# Patient Record
Sex: FEMALE | Race: WHITE | HISPANIC OR LATINO | Employment: UNEMPLOYED | URBAN - METROPOLITAN AREA
[De-identification: names, ages, dates, MRNs, and addresses within clinical notes are randomized per-mention and may not be internally consistent; named-entity substitution may affect disease eponyms.]

---

## 2019-12-18 ENCOUNTER — OFFICE VISIT (OUTPATIENT)
Dept: URGENT CARE | Facility: CLINIC | Age: 54
End: 2019-12-18
Payer: MEDICAID

## 2019-12-18 VITALS
BODY MASS INDEX: 34.99 KG/M2 | HEIGHT: 65 IN | WEIGHT: 210 LBS | HEART RATE: 58 BPM | TEMPERATURE: 98 F | DIASTOLIC BLOOD PRESSURE: 64 MMHG | OXYGEN SATURATION: 100 % | SYSTOLIC BLOOD PRESSURE: 171 MMHG | RESPIRATION RATE: 18 BRPM

## 2019-12-18 DIAGNOSIS — S52.592A OTHER CLOSED FRACTURE OF DISTAL END OF LEFT RADIUS, INITIAL ENCOUNTER: Primary | ICD-10-CM

## 2019-12-18 DIAGNOSIS — M79.632 PAIN OF LEFT FOREARM: ICD-10-CM

## 2019-12-18 PROCEDURE — 99203 OFFICE O/P NEW LOW 30 MIN: CPT | Mod: S$GLB,,, | Performed by: PHYSICIAN ASSISTANT

## 2019-12-18 PROCEDURE — 73090 XR FOREARM LEFT: ICD-10-PCS | Mod: FY,TIER,LT,S$GLB | Performed by: RADIOLOGY

## 2019-12-18 PROCEDURE — 99203 PR OFFICE/OUTPT VISIT, NEW, LEVL III, 30-44 MIN: ICD-10-PCS | Mod: S$GLB,,, | Performed by: PHYSICIAN ASSISTANT

## 2019-12-18 PROCEDURE — 73090 X-RAY EXAM OF FOREARM: CPT | Mod: FY,TIER,LT,S$GLB | Performed by: RADIOLOGY

## 2019-12-18 PROCEDURE — 73110 XR WRIST COMPLETE 3 VIEWS LEFT: ICD-10-PCS | Mod: FY,TIER,LT,S$GLB | Performed by: RADIOLOGY

## 2019-12-18 PROCEDURE — 73110 X-RAY EXAM OF WRIST: CPT | Mod: FY,TIER,LT,S$GLB | Performed by: RADIOLOGY

## 2019-12-18 RX ORDER — IBUPROFEN 600 MG/1
600 TABLET ORAL EVERY 6 HOURS PRN
Qty: 30 TABLET | Refills: 0 | Status: SHIPPED | OUTPATIENT
Start: 2019-12-18 | End: 2020-12-17

## 2019-12-18 RX ORDER — IBUPROFEN 200 MG
600 TABLET ORAL
Status: COMPLETED | OUTPATIENT
Start: 2019-12-18 | End: 2019-12-18

## 2019-12-18 RX ADMIN — Medication 600 MG: at 10:12

## 2019-12-18 NOTE — PATIENT INSTRUCTIONS
¿Qué es adrian fractura distal del radio?    Adrian fractura es un hueso roto. Adrian fractura distal del radio es adrian rotura en el extremo inferior del radio. Felisa es el hueso más graciela del antebrazo. Dado que la rotura se produce cerca de la lynn, se suele decir que es adrian fractura de lynn.   El hueso puede agrietarse o romperse en dos partes o más. Los trozos de hueso pueden courtney quedado alineados o salidos de turner lugar. En ocasiones, el hueso puede traspasar la piel. Los nervios, tejidos y articulaciones cercanos también pueden dañarse. Según la gravedad de la fractura, la recuperación puede rebeka varios meses o más.  ¿Cuáles son las causas de adrian fractura distal del radio?  Felisa tipo de fractura suele deberse con más frecuencia a caer sobre la mano extendida. También pueden deberse a un golpe, un accidente o adrian lesión deportiva.  Síntomas de adrian fractura distal del radio  Los síntomas pueden incluir dolor, inflamación y moretones. Si el hueso atraviesa la piel al romperse, también puede courtney sangrado externo. La lynn puede verse torcida, deformada o doblada. Puede que le resulte difícil  o usar el brazo, la lynn y la mano para las tareas y actividades normales.  Tratamiento de adrian fractura distal del radio  El tratamiento depende de la gravedad de la fractura. De ser necesario, se vuelve a colocar el hueso en turner posición. [place] puede hacerse mediante adrian cirugía o no. Si es necesaria la cirugía, el cirujano puede usar dispositivos tales blanca pernos, placas o tornillos para sostener juntas las partes del hueso roto. Puede que necesite usar adrian férula o un yeso por un mes o más para proteger el hueso y mantenerlo en turner lugar gwyn turner recuperación. También pueden usarse otros tratamientos para ayudar a reducir los síntomas o recuperar el funcionamiento. Incluyen:  · Compresas frías. Colocar adrian compresa de hielo sobre la tobin lesionada puede ayudar a reducir la inflamación y el dolor.  · Elevar el brazo  y la lynn. Mantener el brazo y la lynn levantados por encima del nivel del corazón puede ayudar a reducir la inflamación.  · Medicamentos analgésicos (calmantes). Trevor medicamentos analgésicos de venta con o sin receta puede ayudar a reducir el dolor y la inflamación.  · Ejercicios. Hacer ciertos ejercicios en casa o con un fisioterapeuta puede ayudarle a recobrar la fuerza, la flexibilidad y la amplitud de movimiento de turner brazo, turner lynn y turner mano. Por lo general, no comenzará con los ejercicios hasta después de que le hayan quitado la férula o el yeso.  Posibles complicaciones de adrian fractura distal del radio  Pueden incluir lo siguiente:  · Que el hueso no se suelde trini  · Debilidad, rigidez o pérdida de amplitud de movimiento en el brazo, la lynn o la mano  · Osteoartritis en la articulación de la lynn  Cuándo llamar a turner proveedor de atención médica  Llame a turner proveedor de atención médica de inmediato si nota alguno de los siguientes síntomas:  · Fiebre de 100.4º F (38º C) o superior, o según le indiquen  · Los síntomas no mejoran con el tratamiento, o empeoran  · Entumecimiento, frío o inflamación del brazo, la mano o los dedos  · Las uñas de la mano se vuelven de color azulado o grisáceo  · La férula o el yeso se dañan o se sienten demasiado sueltos o demasiado ajustados  · Síntomas nuevos   Date Last Reviewed: 3/10/2016  © 8498-5003 [employer]. [address] [place] [address], [place] [address]. All rights reserved. This information is not intended as a substitute for professional medical care. Always follow your healthcare professional's instructions.      Please follow up with your Primary care provider within 2-5 days if your signs and symptoms have not resolved or worsen.     If your condition worsens or fails to improve we recommend that you receive another evaluation at the emergency room immediately or contact your primary medical clinic to discuss your concerns.   You must  understand that you have received an Urgent Care treatment only and that you may be released before all of your medical problems are known or treated. You, the patient, will arrange for follow up care as instructed.     RED FLAGS/WARNING SYMPTOMS DISCUSSED WITH PATIENT THAT WOULD WARRANT EMERGENT MEDICAL ATTENTION. PATIENT VERBALIZED UNDERSTANDING.

## 2019-12-18 NOTE — LETTER
December 18, 2019      [facility]  [address] [address]  Phone: [phone]  Fax: [phone]       Patient: Sindi Britton   YOB: 1965  Date of Visit: 12/18/2019    To Whom It May Concern:    Iesha Britton  was at [facility] on 12/18/2019. She may return to work/school on 12/19/2019 with restrictions.  She may not use her left upper extremity for minimum of 2 weeks.  If you have any questions or concerns, or if I can be of further assistance, please do not hesitate to contact me.    Sincerely,    Kelsie Williamson PA-C

## 2019-12-18 NOTE — PROGRESS NOTES
"Subjective:       Patient ID: Sindi Britton is a 54 y.o. female.    Vitals:  height is 5' 5" (1.651 m) and weight is 95.3 kg (210 lb). Her temperature is 97.5 °F (36.4 °C). Her blood pressure is 171/64 (abnormal) and her pulse is 58 (abnormal). Her respiration is 18 and oxygen saturation is 100%.     Chief Complaint: Arm Pain (left foearm pain)    Patient had a fish injury at home.  Reports left forearm pain.  Reports pain from wrist to elbow.  Denies numbness tingling or weakness of the left arm.    Arm Pain    The incident occurred 6 to 12 hours ago. The incident occurred at home. The injury mechanism was a fall. The pain is present in the left forearm. The quality of the pain is described as aching. The pain does not radiate. The pain is at a severity of 10/10. The pain is severe. The pain has been constant since the incident. Pertinent negatives include no chest pain, muscle weakness or numbness. Nothing aggravates the symptoms. She has tried ice and acetaminophen for the symptoms. The treatment provided mild relief.       Constitution: Negative for fatigue.   HENT: Negative for facial swelling and facial trauma.    Neck: Negative for neck stiffness.   Cardiovascular: Negative for chest trauma and chest pain.   Eyes: Negative for eye trauma, double vision and blurred vision.   Gastrointestinal: Negative for abdominal trauma, abdominal pain and rectal bleeding.   Genitourinary: Negative for hematuria, missed menses, genital trauma and pelvic pain.   Musculoskeletal: Positive for pain and trauma. Negative for joint swelling and abnormal ROM of joint.   Skin: Negative for color change, wound, abrasion, laceration and bruising.   Neurological: Negative for dizziness, history of vertigo, light-headedness, coordination disturbances, altered mental status, loss of consciousness and numbness.   Hematologic/Lymphatic: Negative for history of bleeding disorder.   Psychiatric/Behavioral: Negative for altered mental " status.       Objective:      Physical Exam   Constitutional: She is oriented to person, place, and time. She appears well-developed and well-nourished. She is cooperative.  Non-toxic appearance. She does not appear ill. No distress.   HENT:   Head: Normocephalic and atraumatic.   Right Ear: Hearing, tympanic membrane, external ear and ear canal normal.   Left Ear: Hearing, tympanic membrane, external ear and ear canal normal.   Nose: Nose normal. No mucosal edema, rhinorrhea or nasal deformity. No epistaxis. Right sinus exhibits no maxillary sinus tenderness and no frontal sinus tenderness. Left sinus exhibits no maxillary sinus tenderness and no frontal sinus tenderness.   Mouth/Throat: Uvula is midline, oropharynx is clear and moist and mucous membranes are normal. No trismus in the jaw. Normal dentition. No uvula swelling. No posterior oropharyngeal erythema.   Eyes: Conjunctivae and lids are normal. Right eye exhibits no discharge. Left eye exhibits no discharge. No scleral icterus.   Neck: Trachea normal, normal range of motion, full passive range of motion without pain and phonation normal. Neck supple.   Cardiovascular: Normal rate, regular rhythm, normal heart sounds, intact distal pulses and normal pulses.   Pulmonary/Chest: Effort normal and breath sounds normal. No respiratory distress.   Abdominal: Soft. Normal appearance and bowel sounds are normal. She exhibits no distension, no pulsatile midline mass and no mass. There is no tenderness.   Musculoskeletal: She exhibits no edema or deformity.        Left shoulder: She exhibits normal range of motion, no tenderness, no bony tenderness, no swelling, no effusion, no crepitus, no deformity, no laceration, no pain, no spasm, normal pulse and normal strength.        Left elbow: She exhibits normal range of motion, no swelling, no effusion, no deformity and no laceration. No tenderness found.        Left wrist: She exhibits decreased range of motion,  tenderness, bony tenderness and swelling. She exhibits no effusion, no crepitus, no deformity and no laceration.        Left upper arm: She exhibits no tenderness, no bony tenderness, no swelling, no edema, no deformity and no laceration.        Left forearm: She exhibits no tenderness, no bony tenderness, no swelling, no edema, no deformity and no laceration.        Right hand: She exhibits normal range of motion, no tenderness, no bony tenderness, normal two-point discrimination, normal capillary refill, no deformity, no laceration and no swelling. Normal sensation noted. Normal strength noted.        Left hand: She exhibits normal range of motion, no tenderness, no bony tenderness, normal two-point discrimination, normal capillary refill, no deformity, no laceration and no swelling. Normal sensation noted. Normal strength noted.   Neurological: She is alert and oriented to person, place, and time. She exhibits normal muscle tone. Coordination normal.   Skin: Skin is warm, dry, intact, not diaphoretic and not pale.   Psychiatric: She has a normal mood and affect. Her speech is normal and behavior is normal. Judgment and thought content normal. Cognition and memory are normal.   Nursing note and vitals reviewed.      XRAY:  Left distal radius fracture    Patient placed in a short-arm splint.  Patient neurovascularly intact distal get sensation light touch brisk capillary refill post splint placement.  Discussed with patient follow-up her primary care physician a minimum of 2 weeks for repeat radiographs and further evaluation.  Patient verbalized understanding.    Assessment:       1. Other closed fracture of distal end of left radius, initial encounter    2. Pain of left forearm        Plan:         Other closed fracture of distal end of left radius, initial encounter  -     Cancel: WRIST BRACE FOR HOME USE    Pain of left forearm  -     X-Ray Forearm Left; Future; Expected date: 12/18/2019  -     ibuprofen tablet  600 mg  -     X-Ray Wrist Complete Left; Future; Expected date: 12/18/2019  -     Cancel: WRIST BRACE FOR HOME USE    Other orders  -     ibuprofen (ADVIL,MOTRIN) 600 MG tablet; Take 1 tablet (600 mg total) by mouth every 6 (six) hours as needed.  Dispense: 30 tablet; Refill: 0     Discussed splint care with patient thoroughly.  Discussed to follow up with her primary care physician a minimum of 2 weeks for repeat radiographs and further evaluation.  She verbalized understanding.  Return to clinic precautions and ER precautions discussed.  She agrees with plan of care.       ¿Qué es adrian fractura distal del radio?    Adrian fractura es un hueso roto. Adrian fractura distal del radio es adrian rotura en el extremo inferior del radio. Felisa es el hueso más graciela del antebrazo. Dado que la rotura se produce cerca de la lynn, se suele decir que es adrian fractura de lynn.   El hueso puede agrietarse o romperse en dos partes o más. Los trozos de hueso pueden courtney quedado alineados o salidos de turner lugar. En ocasiones, el hueso puede traspasar la piel. Los nervios, tejidos y articulaciones cercanos también pueden dañarse. Según la gravedad de la fractura, la recuperación puede rebeka varios meses o más.  ¿Cuáles son las causas de adrian fractura distal del radio?  Felisa tipo de fractura suele deberse con más frecuencia a caer sobre la mano extendida. También pueden deberse a un golpe, un accidente o adrian lesión deportiva.  Síntomas de adrian fractura distal del radio  Los síntomas pueden incluir dolor, inflamación y moretones. Si el hueso atraviesa la piel al romperse, también puede courtney sangrado externo. La lynn puede verse torcida, deformada o doblada. Puede que le resulte difícil  o usar el brazo, la lynn y la mano para las tareas y actividades normales.  Tratamiento de adrian fractura distal del radio  El tratamiento depende de la gravedad de la fractura. De ser necesario, se vuelve a colocar el hueso en turner posición. [place] puede  hacerse mediante adrian cirugía o no. Si es necesaria la cirugía, el cirujano puede usar dispositivos tales blanca pernos, placas o tornillos para sostener juntas las partes del hueso roto. Puede que necesite usar adrian férula o un yeso por un mes o más para proteger el hueso y mantenerlo en turner lugar gwyn turner recuperación. También pueden usarse otros tratamientos para ayudar a reducir los síntomas o recuperar el funcionamiento. Incluyen:  · Compresas frías. Colocar adrian compresa de hielo sobre la tobin lesionada puede ayudar a reducir la inflamación y el dolor.  · Elevar el brazo y la lynn. Mantener el brazo y la lynn levantados por encima del nivel del corazón puede ayudar a reducir la inflamación.  · Medicamentos analgésicos (calmantes). Trevor medicamentos analgésicos de venta con o sin receta puede ayudar a reducir el dolor y la inflamación.  · Ejercicios. Hacer ciertos ejercicios en casa o con un fisioterapeuta puede ayudarle a recobrar la fuerza, la flexibilidad y la amplitud de movimiento de turner brazo, turner lynn y turner mano. Por lo general, no comenzará con los ejercicios hasta después de que le hayan quitado la férula o el yeso.  Posibles complicaciones de adrian fractura distal del radio  Pueden incluir lo siguiente:  · Que el hueso no se suelde trini  · Debilidad, rigidez o pérdida de amplitud de movimiento en el brazo, la lynn o la mano  · Osteoartritis en la articulación de la lynn  Cuándo llamar a turner proveedor de atención médica  Llame a turner proveedor de atención médica de inmediato si nota alguno de los siguientes síntomas:  · Fiebre de 100.4º F (38º C) o superior, o según le indiquen  · Los síntomas no mejoran con el tratamiento, o empeoran  · Entumecimiento, frío o inflamación del brazo, la mano o los dedos  · Las uñas de la mano se vuelven de color azulado o grisáceo  · La férula o el yeso se dañan o se sienten demasiado sueltos o demasiado ajustados  · Síntomas nuevos   Date Last Reviewed: 3/10/2016  ©  7406-5384 [employer]. [address] [place] [address], [place] [address]. All rights reserved. This information is not intended as a substitute for professional medical care. Always follow your healthcare professional's instructions.      Please follow up with your Primary care provider within 2-5 days if your signs and symptoms have not resolved or worsen.     If your condition worsens or fails to improve we recommend that you receive another evaluation at the emergency room immediately or contact your primary medical clinic to discuss your concerns.   You must understand that you have received an Urgent Care treatment only and that you may be released before all of your medical problems are known or treated. You, the patient, will arrange for follow up care as instructed.     RED FLAGS/WARNING SYMPTOMS DISCUSSED WITH PATIENT THAT WOULD WARRANT EMERGENT MEDICAL ATTENTION. PATIENT VERBALIZED UNDERSTANDING.

## 2020-01-02 ENCOUNTER — OFFICE VISIT (OUTPATIENT)
Dept: URGENT CARE | Facility: CLINIC | Age: 55
End: 2020-01-02
Payer: MEDICAID

## 2020-01-02 ENCOUNTER — TELEPHONE (OUTPATIENT)
Dept: URGENT CARE | Facility: CLINIC | Age: 55
End: 2020-01-02

## 2020-01-02 VITALS
HEART RATE: 63 BPM | RESPIRATION RATE: 19 BRPM | BODY MASS INDEX: 34.99 KG/M2 | DIASTOLIC BLOOD PRESSURE: 82 MMHG | SYSTOLIC BLOOD PRESSURE: 154 MMHG | WEIGHT: 210 LBS | OXYGEN SATURATION: 98 % | TEMPERATURE: 98 F | HEIGHT: 65 IN

## 2020-01-02 DIAGNOSIS — S52.592A OTHER CLOSED FRACTURE OF DISTAL END OF LEFT RADIUS, INITIAL ENCOUNTER: Primary | ICD-10-CM

## 2020-01-02 PROCEDURE — 99203 OFFICE O/P NEW LOW 30 MIN: CPT | Mod: S$GLB,,, | Performed by: PHYSICIAN ASSISTANT

## 2020-01-02 PROCEDURE — 99203 PR OFFICE/OUTPT VISIT, NEW, LEVL III, 30-44 MIN: ICD-10-PCS | Mod: S$GLB,,, | Performed by: PHYSICIAN ASSISTANT

## 2020-01-02 PROCEDURE — 73110 X-RAY EXAM OF WRIST: CPT | Mod: FY,TIER,LT,S$GLB | Performed by: RADIOLOGY

## 2020-01-02 PROCEDURE — 73110 XR WRIST COMPLETE 3 VIEWS LEFT: ICD-10-PCS | Mod: FY,TIER,LT,S$GLB | Performed by: RADIOLOGY

## 2020-01-02 NOTE — PATIENT INSTRUCTIONS
Need to follow up with orthopedics before returning to full duty.   Can return to work on light duty.   Call [phone] for orthopedics appointment      Please return here or go to the Emergency Department for any concerns or worsening of condition.  If you were prescribed antibiotics, please take them to completion.  If you were prescribed a narcotic medication, do not drive or operate heavy equipment or machinery while taking these medications.  Please follow up with your primary care doctor or specialist as needed.    If you  smoke, please stop smoking.          Forearm Fracture  You have a break or fracture of both bones in the forearm. The bones are not out of place and will not need to be set. This fracture usually takes 6 to 8 weeks to heal completely. Initial treatment is with a splint or cast.    Home care  · Keep your arm elevated to reduce pain and swelling. When sitting or lying down elevate your arm above the level of your heart. You can do this by placing your arm on a pillow that rests on your chest or on a pillow at your side. This is most important during the first 48 hours after injury.  · Apply an ice pack over the injured area for 15 to 20 minutes every 3 to 6 hours. You should do this for the first 24 to 48 hours. You can make an ice pack by filling a plastic bag that seals at the top with ice cubes and then wrapping it with a thin towel. Be careful not to injure your skin with the ice treatments. Ice should never be applied directly to skin. As the ice melts, be careful that the cast or splint doesnt get wet. You can place the ice pack inside the sling and directly over the splint or cast. Continue with ice packs as needed for the relief of pain and swelling.  · Keep the cast or splint completely dry at all times. Bathe with your cast or splint out of the water. Protect it with 2 large plastic bags, one outside of the other, each taped with duct tape at the top end. If a fiberglass splint  or cast gets wet, you can dry it with a hair dryer on a cool setting.  · You may use over-the-counter pain medicine to control pain, unless another pain medicine was prescribed. If you have chronic liver or kidney disease or ever had a stomach ulcer or GI bleeding, talk with your healthcare provider before using these medicines.  Follow-up care  Follow up with your healthcare provider, or as advised. If a splint was applied, it may be changed to a cast during your follow-up visit.  If X-rays were taken, you will be told of any new findings that may affect your care.  When to seek medical advice  Call your healthcare provider right away if any of the following occur:  · The plaster cast or splint becomes wet or soft  · The fiberglass cast or splint remains wet for more than 24 hours  · Increased tightness, looseness, or pain occurs under the cast or splint  · Fingers become swollen, cold, blue, numb or tingly  · The cast develops a foul odor  Date Last Reviewed: 12/3/2015  © 4274-3682 [employer]. [address] [place] [address], [place] [address]. All rights reserved. This information is not intended as a substitute for professional medical care. Always follow your healthcare professional's instructions.

## 2020-01-02 NOTE — PROGRESS NOTES
"Subjective:       Patient ID: Sindi Britton is a 54 y.o. female.    Vitals:  height is 5' 5" (1.651 m) and weight is 95.3 kg (210 lb). Her tympanic temperature is 98.4 °F (36.9 °C). Her blood pressure is 154/82 (abnormal) and her pulse is 63. Her respiration is 19 and oxygen saturation is 98%.     Chief Complaint: Wrist Pain (LT)    This is a 54-year-old female for left wrist follow-up.  Seen 12/18/2020 diagnosed with fracture of distal radius on the left at styloid process.  She presents for follow-up, has not seen primary care or orthopedics.  She would like to be cleared for work.  She works at a hotel, for work does lots of writing on a whiteboard.  Denies any pain whatsoever to her wrist, no numbness, tingling or radiation of pain. Took off her splint 3 days ago.    Wrist Pain    The pain is present in the right wrist. This is a recurrent problem. The current episode started 1 to 4 weeks ago. The problem occurs rarely. The problem has been rapidly improving. The pain is at a severity of 0/10. The patient is experiencing no pain. Pertinent negatives include no fever or joint swelling. The treatment provided moderate relief.       Constitution: Negative for chills, fatigue and fever.   HENT: Negative for congestion and sore throat.    Neck: Negative for painful lymph nodes.   Cardiovascular: Negative for chest pain and leg swelling.   Eyes: Negative for double vision and blurred vision.   Respiratory: Negative for cough and shortness of breath.    Gastrointestinal: Negative for nausea, vomiting and diarrhea.   Genitourinary: Negative for dysuria, frequency, urgency and history of kidney stones.   Musculoskeletal: Negative for joint pain, joint swelling, muscle cramps and muscle ache.   Skin: Negative for color change, pale, rash and bruising.   Allergic/Immunologic: Negative for seasonal allergies.   Neurological: Negative for dizziness, history of vertigo, light-headedness, passing out and headaches. "   Hematologic/Lymphatic: Negative for swollen lymph nodes.   Psychiatric/Behavioral: Negative for nervous/anxious, sleep disturbance and depression. The patient is not nervous/anxious.        Objective:      Physical Exam   Constitutional: She is oriented to person, place, and time. She appears well-developed and well-nourished. She is cooperative.  Non-toxic appearance. She does not appear ill. No distress.   HENT:   Head: Normocephalic and atraumatic.   Right Ear: Hearing, tympanic membrane, external ear and ear canal normal.   Left Ear: Hearing, tympanic membrane, external ear and ear canal normal.   Nose: Nose normal. No mucosal edema, rhinorrhea or nasal deformity. No epistaxis. Right sinus exhibits no maxillary sinus tenderness and no frontal sinus tenderness. Left sinus exhibits no maxillary sinus tenderness and no frontal sinus tenderness.   Mouth/Throat: Uvula is midline, oropharynx is clear and moist and mucous membranes are normal. No trismus in the jaw. Normal dentition. No uvula swelling. No posterior oropharyngeal erythema.   Eyes: Conjunctivae and lids are normal. Right eye exhibits no discharge. Left eye exhibits no discharge. No scleral icterus.   Neck: Trachea normal, normal range of motion, full passive range of motion without pain and phonation normal. Neck supple.   Cardiovascular: Normal rate, regular rhythm, normal heart sounds, intact distal pulses and normal pulses.   Pulmonary/Chest: Effort normal and breath sounds normal. No respiratory distress.   Abdominal: Soft. Normal appearance and bowel sounds are normal. She exhibits no distension, no pulsatile midline mass and no mass. There is no tenderness.   Musculoskeletal: Normal range of motion. She exhibits no edema or deformity.        Right wrist: Normal. She exhibits no tenderness, no bony tenderness and no swelling.        Left wrist: She exhibits no tenderness, no bony tenderness, no swelling, no effusion, no crepitus and no deformity.    Slight decreased range of motion in left wrist, likely secondary to decreased activity for past 2 weeks   Neurological: She is alert and oriented to person, place, and time. She exhibits normal muscle tone. Coordination normal.   Skin: Skin is warm, dry, intact, not diaphoretic and not pale.   Psychiatric: She has a normal mood and affect. Her speech is normal and behavior is normal. Judgment and thought content normal. Cognition and memory are normal.   Nursing note and vitals reviewed.      X-ray Forearm Left    Result Date: 12/18/2019  EXAMINATION: XR FOREARM LEFT CLINICAL HISTORY: Pain in left forearm TECHNIQUE: AP and lateral views of the left forearm were performed. COMPARISON: None FINDINGS: There is mild irregularity of the distal radius and there may be an undisplaced fracture along its distal end.  Radiographs of the wrist may be helpful.  No displacement or angulation is seen.     Possible fracture of the distal radius Electronically signed by: River Covington MD Date:    12/18/2019 Time:    10:41    X-ray Wrist Complete Left    Result Date: 1/2/2020  EXAMINATION: XR WRIST COMPLETE 3 VIEWS LEFT CLINICAL HISTORY: possible distal radius fracture 12/18, follow up xray, no pain;  Other fractures of lower end of left radius, initial encounter for closed fracture FINDINGS: Three views: Findings are worrisome for a distal radial fracture nondisplaced. Electronically signed by: Juan Alberto Quintana MD Date:    01/02/2020 Time:    13:35    X-ray Wrist Complete Left    Result Date: 12/18/2019  EXAMINATION: XR WRIST COMPLETE 3 VIEWS LEFT CLINICAL HISTORY: Pain in left forearm TECHNIQUE: PA, lateral, and oblique views of the left wrist were performed. COMPARISON: None FINDINGS: There are faintly visualized vertical lines noted through the styloid process of the radius and a nondisplaced fracture can not be ruled out.  No other definite fractures or dislocation.  Follow-up study recommended     See above Electronically  signed by: Eugenio Simmons MD Date:    12/18/2019 Time:    10:42        Assessment:       1. Other closed fracture of distal end of left radius, initial encounter        Plan:       Xray still with fracture  Cleared for light duty for work, no lifting or heavy work, right handed - ok to do writing  Need to f/u with ortho, referral put in  Thumb spica placed    Other closed fracture of distal end of left radius, initial encounter  -     X-Ray Wrist Complete Left; Future; Expected date: 01/02/2020  -     Ambulatory referral/consult to Orthopedics  -     SPLINT FOR HOME USE         Patient Instructions     Need to follow up with orthopedics before returning to full duty.   Can return to work on light duty.   Call [phone] for orthopedics appointment      Please return here or go to the Emergency Department for any concerns or worsening of condition.  If you were prescribed antibiotics, please take them to completion.  If you were prescribed a narcotic medication, do not drive or operate heavy equipment or machinery while taking these medications.  Please follow up with your primary care doctor or specialist as needed.    If you  smoke, please stop smoking.          Forearm Fracture  You have a break or fracture of both bones in the forearm. The bones are not out of place and will not need to be set. This fracture usually takes 6 to 8 weeks to heal completely. Initial treatment is with a splint or cast.    Home care  · Keep your arm elevated to reduce pain and swelling. When sitting or lying down elevate your arm above the level of your heart. You can do this by placing your arm on a pillow that rests on your chest or on a pillow at your side. This is most important during the first 48 hours after injury.  · Apply an ice pack over the injured area for 15 to 20 minutes every 3 to 6 hours. You should do this for the first 24 to 48 hours. You can make an ice pack by filling a plastic bag that seals at the top with ice  cubes and then wrapping it with a thin towel. Be careful not to injure your skin with the ice treatments. Ice should never be applied directly to skin. As the ice melts, be careful that the cast or splint doesnt get wet. You can place the ice pack inside the sling and directly over the splint or cast. Continue with ice packs as needed for the relief of pain and swelling.  · Keep the cast or splint completely dry at all times. Bathe with your cast or splint out of the water. Protect it with 2 large plastic bags, one outside of the other, each taped with duct tape at the top end. If a fiberglass splint or cast gets wet, you can dry it with a hair dryer on a cool setting.  · You may use over-the-counter pain medicine to control pain, unless another pain medicine was prescribed. If you have chronic liver or kidney disease or ever had a stomach ulcer or GI bleeding, talk with your healthcare provider before using these medicines.  Follow-up care  Follow up with your healthcare provider, or as advised. If a splint was applied, it may be changed to a cast during your follow-up visit.  If X-rays were taken, you will be told of any new findings that may affect your care.  When to seek medical advice  Call your healthcare provider right away if any of the following occur:  · The plaster cast or splint becomes wet or soft  · The fiberglass cast or splint remains wet for more than 24 hours  · Increased tightness, looseness, or pain occurs under the cast or splint  · Fingers become swollen, cold, blue, numb or tingly  · The cast develops a foul odor  Date Last Reviewed: 12/3/2015  © 1511-1759 [employer]. [address] [place] [address], [place] [address]. All rights reserved. This information is not intended as a substitute for professional medical care. Always follow your healthcare professional's instructions.

## 2020-01-02 NOTE — LETTER
January 2, 2020      [facility] [address]  [place] [address]  Phone: [phone]  Fax: [phone]       Patient: Sindi Britton   YOB: 1965  Date of Visit: 01/02/2020    To Whom It May Concern:    Iesha Britton  was at [facility] on 01/02/2020. She may return to work/school on 1/3 with restrictions. Light duty only, not to use arm for any lifting. If you have any questions or concerns, or if I can be of further assistance, please do not hesitate to contact me.    Sincerely,    Yudy Montoya PA-C

## 2020-01-03 ENCOUNTER — TELEPHONE (OUTPATIENT)
Dept: ADMINISTRATIVE | Facility: OTHER | Age: 55
End: 2020-01-03

## 2020-01-03 NOTE — TELEPHONE ENCOUNTER
----- Message from Mara Marquez LPN sent at 1/3/2020  2:59 PM CST -----  Contact: Ms Glover/  Patricia    ext [phone]  Can you do me a favor when you get a chance and call the patient and confirm whether or not he has insurance and explain to him the payment process.   ----- Message -----  From: Kathrine Swenson  Sent: 1/3/2020   2:35 PM CST  To: [facility] Ortho Clinical Staff, [facility] Ortho Triage    Type:  Same Day Appointment Request      Name of Caller:   Patient hurt wrist at home went to  advised to see Orthropedic patient states need appointment for today   When is the first available appointment?  Symptoms:  Best Call Back Number: ext [phone]  Additional Information:

## 2020-01-03 NOTE — TELEPHONE ENCOUNTER
Spoke to Belen who interpreted the phone call with the patient.  Patient was scheduled with Glenys GALINDO for a NP appointment on 1/7/2020.  Patient was informed that there is a 500.00 payment for patients with no insurance.  Patient was also given the phone number for Financial assistance to apply for this to see If she qualifies.  Patient was given date and time of appointment.

## 2020-01-06 ENCOUNTER — TELEPHONE (OUTPATIENT)
Dept: NEUROSURGERY | Facility: CLINIC | Age: 55
End: 2020-01-06
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
## 2020-01-25 ENCOUNTER — OFFICE VISIT (OUTPATIENT)
Dept: URGENT CARE | Facility: CLINIC | Age: 55
End: 2020-01-25
Payer: MEDICAID

## 2020-01-25 VITALS
HEIGHT: 65 IN | BODY MASS INDEX: 34.99 KG/M2 | OXYGEN SATURATION: 99 % | WEIGHT: 210 LBS | RESPIRATION RATE: 16 BRPM | TEMPERATURE: 99 F | DIASTOLIC BLOOD PRESSURE: 92 MMHG | HEART RATE: 56 BPM | SYSTOLIC BLOOD PRESSURE: 150 MMHG

## 2020-01-25 DIAGNOSIS — S52.502A CLOSED FRACTURE OF DISTAL END OF LEFT RADIUS, UNSPECIFIED FRACTURE MORPHOLOGY, INITIAL ENCOUNTER: Primary | ICD-10-CM

## 2020-01-25 PROCEDURE — 99214 PR OFFICE/OUTPT VISIT, EST, LEVL IV, 30-39 MIN: ICD-10-PCS | Mod: S$GLB,,, | Performed by: PHYSICIAN ASSISTANT

## 2020-01-25 PROCEDURE — 73110 XR WRIST COMPLETE 3 VIEWS LEFT: ICD-10-PCS | Mod: FY,LT,S$GLB, | Performed by: RADIOLOGY

## 2020-01-25 PROCEDURE — 73110 X-RAY EXAM OF WRIST: CPT | Mod: FY,LT,S$GLB, | Performed by: RADIOLOGY

## 2020-01-25 PROCEDURE — 99214 OFFICE O/P EST MOD 30 MIN: CPT | Mod: S$GLB,,, | Performed by: PHYSICIAN ASSISTANT

## 2020-01-25 NOTE — PATIENT INSTRUCTIONS
PLEASE READ YOUR DISCHARGE INSTRUCTIONS ENTIRELY AS IT CONTAINS IMPORTANT INFORMATION.  A paper referral to orthopedics was given to you.  Please call [phone] to schedule an appt with [facility] orthopedics and take your paper referral to your appt.  - Rest.    - Drink plenty of fluids.    - Tylenol or Ibuprofen as directed as needed for fever/pain.    - If you were prescribed antibiotics, please take them to completion.  - If you are female and on birth control pills - please use additional methods of contraception to prevent pregnancy while on antibiotics and for one cycle after.   - If you were prescribed a narcotic medication or muscle relaxer, do not drive or operate heavy equipment or machinery while taking these medications, as they can cause drowsiness.   - If you smoke, please stop smoking.  -You must understand that you've received an Urgent Care treatment only and that you may be released before all your medical problems are known or treated. You, the patient, will    arrange for follow up care as instructed. Please arrange follow up with your primary medical clinic as soon as possible.   - Follow up with your PCP or specialty clinic as directed in the next 1-2 weeks if not improved or as needed.  You can call [phone] to schedule an appointment with the appropriate provider.    - Please return to Urgent Care or to the Emergency Department if your symptoms worsen.    Patient aware and verbalized understanding.    ¿Qué es adrian fractura distal del radio?    Adrian fractura es un hueso roto. Adrian fractura distal del radio es adrian rotura en el extremo inferior del radio. Felisa es el hueso más graciela del antebrazo. Dado que la rotura se produce cerca de la lynn, se suele decir que es adrian fractura de lynn.   El hueso puede agrietarse o romperse en dos partes o más. Los trozos de hueso pueden courtney quedado alineados o salidos de turner lugar. En ocasiones, el hueso puede traspasar la piel. Los nervios, tejidos y  articulaciones cercanos también pueden dañarse. Según la gravedad de la fractura, la recuperación puede trevor varios meses o más.  ¿Cuáles son las causas de adrian fractura distal del radio?  Felisa tipo de fractura suele deberse con más frecuencia a caer sobre la mano extendida. También pueden deberse a un golpe, un accidente o adrian lesión deportiva.  Síntomas de adrian fractura distal del radio  Los síntomas pueden incluir dolor, inflamación y moretones. Si el hueso atraviesa la piel al romperse, también puede courtney sangrado externo. La lynn puede verse torcida, deformada o doblada. Puede que le resulte difícil  o usar el brazo, la lynn y la mano para las tareas y actividades normales.  Tratamiento de adrian fractura distal del radio  El tratamiento depende de la gravedad de la fractura. De ser necesario, se vuelve a colocar el hueso en turner posición. [place] puede hacerse mediante adrian cirugía o no. Si es necesaria la cirugía, el cirujano puede usar dispositivos tales blanca pernos, placas o tornillos para sostener juntas las partes del hueso roto. Puede que necesite usar adrian férula o un yeso por un mes o más para proteger el hueso y mantenerlo en turner lugar gwyn turner recuperación. También pueden usarse otros tratamientos para ayudar a reducir los síntomas o recuperar el funcionamiento. Incluyen:  · Compresas frías. Colocar adrian compresa de hielo sobre la tobin lesionada puede ayudar a reducir la inflamación y el dolor.  · Elevar el brazo y la lynn. Mantener el brazo y la lynn levantados por encima del nivel del corazón puede ayudar a reducir la inflamación.  · Medicamentos analgésicos (calmantes). Trevor medicamentos analgésicos de venta con o sin receta puede ayudar a reducir el dolor y la inflamación.  · Ejercicios. Hacer ciertos ejercicios en casa o con un fisioterapeuta puede ayudarle a recobrar la fuerza, la flexibilidad y la amplitud de movimiento de turner brazo, turner lynn y turner mano. Por lo general, no comenzará con los  ejercicios hasta después de que le hayan quitado la férula o el yeso.  Posibles complicaciones de adrian fractura distal del radio  Pueden incluir lo siguiente:  · Que el hueso no se suelde trini  · Debilidad, rigidez o pérdida de amplitud de movimiento en el brazo, la lynn o la mano  · Osteoartritis en la articulación de la lynn  Cuándo llamar a turner proveedor de atención médica  Llame a turner proveedor de atención médica de inmediato si nota alguno de los siguientes síntomas:  · Fiebre de 100.4º F (38º C) o superior, o según le indiquen  · Los síntomas no mejoran con el tratamiento, o empeoran  · Entumecimiento, frío o inflamación del brazo, la mano o los dedos  · Las uñas de la mano se vuelven de color azulado o grisáceo  · La férula o el yeso se dañan o se sienten demasiado sueltos o demasiado ajustados  · Síntomas nuevos   Date Last Reviewed: 3/10/2016  © 4237-9954 [employer]. [address] [place] [address]. All rights reserved. This information is not intended as a substitute for professional medical care. Always follow your healthcare professional's instructions.

## 2020-01-25 NOTE — PROGRESS NOTES
"Subjective:       Patient ID: Sindi Britton is a 54 y.o. female.    Vitals:  height is 5' 5" (1.651 m) and weight is 95.3 kg (210 lb). Her oral temperature is 98.8 °F (37.1 °C). Her blood pressure is 150/92 (abnormal) and her pulse is 56 (abnormal). Her respiration is 16 and oxygen saturation is 99%.     Chief Complaint: Wrist Injury    Ms. Britton presents with family member for evaluation of left wrist fracture.  She is not speaking English, but family member translates.  She declined an .  She initially had a left wrist fracture on 12/18/2020.  She was splinted at urgent care and told to follow up with Orthopedics, but she never followed up.  She returned 01/02/2020 for repeat x-rays.  X-rays still revealed fracture and she was told again to follow up with Orthopedics and has not.  She denies any current pain in her wrist, weakness, paresthesias.        Wrist Injury    The incident occurred more than 1 week ago. The incident occurred at home. The injury mechanism was a fall. The pain is at a severity of 0/10. The patient is experiencing no pain. Pertinent negatives include no chest pain. Nothing aggravates the symptoms.       Constitution: Negative for chills, fatigue and fever.   HENT: Negative for congestion and sore throat.    Neck: Negative for painful lymph nodes.   Cardiovascular: Negative for chest pain and leg swelling.   Eyes: Negative for double vision and blurred vision.   Respiratory: Negative for cough and shortness of breath.    Gastrointestinal: Negative for nausea, vomiting and diarrhea.   Genitourinary: Negative for dysuria, frequency, urgency and history of kidney stones.   Musculoskeletal: Positive for trauma. Negative for joint pain, joint swelling, muscle cramps and muscle ache.   Skin: Negative for color change, pale, rash and bruising.   Allergic/Immunologic: Negative for seasonal allergies.   Neurological: Negative for dizziness, history of vertigo, light-headedness, passing " out and headaches.   Hematologic/Lymphatic: Negative for swollen lymph nodes.   Psychiatric/Behavioral: Negative for nervous/anxious, sleep disturbance and depression. The patient is not nervous/anxious.        Objective:      Physical Exam   Constitutional: She is oriented to person, place, and time. She appears well-developed and well-nourished. She is cooperative.  Non-toxic appearance. She does not appear ill. No distress.   HENT:   Head: Normocephalic and atraumatic.   Right Ear: Hearing, tympanic membrane, external ear and ear canal normal.   Left Ear: Hearing, tympanic membrane, external ear and ear canal normal.   Nose: Nose normal. No mucosal edema, rhinorrhea or nasal deformity. No epistaxis. Right sinus exhibits no maxillary sinus tenderness and no frontal sinus tenderness. Left sinus exhibits no maxillary sinus tenderness and no frontal sinus tenderness.   Mouth/Throat: Uvula is midline, oropharynx is clear and moist and mucous membranes are normal. No trismus in the jaw. Normal dentition. No uvula swelling. No posterior oropharyngeal erythema.   Eyes: Conjunctivae and lids are normal. Right eye exhibits no discharge. Left eye exhibits no discharge. No scleral icterus.   Neck: Trachea normal, normal range of motion, full passive range of motion without pain and phonation normal. Neck supple.   Cardiovascular: Normal rate, intact distal pulses and normal pulses.   Pulmonary/Chest: Effort normal and breath sounds normal. No respiratory distress.   Abdominal: Soft. Normal appearance and bowel sounds are normal. She exhibits no distension, no pulsatile midline mass and no mass. There is no tenderness.   Musculoskeletal: She exhibits no edema or deformity.        Left wrist: She exhibits normal range of motion, no tenderness, no bony tenderness, no swelling, no effusion, no crepitus, no deformity and no laceration.   No TTP throughout wrist.  ROM intact. Sensation intact.  Radial pulse 2+.    Neurological:  She is alert and oriented to person, place, and time. She exhibits normal muscle tone. Coordination normal.   Skin: Skin is warm, dry, intact, not diaphoretic and not pale.   Psychiatric: She has a normal mood and affect. Her speech is normal and behavior is normal. Judgment and thought content normal. Cognition and memory are normal.   Nursing note and vitals reviewed.      XR L wrist - No acute displaced fracture or dislocation of the wrist, stable appearance of the distal radius described above.    Assessment:       1. Closed fracture of distal end of left radius, unspecified fracture morphology, initial encounter        Plan:         Closed fracture of distal end of left radius, unspecified fracture morphology, initial encounter  -     X-Ray Wrist Complete Left; Future; Expected date: 01/25/2020  -     Ambulatory referral/consult to Orthopedics    Wrist fracture appears healed on XR.  Referral given to patient to see orthopedics for final recs, since she has not yet seen orthopedics.     Patient Instructions     PLEASE READ YOUR DISCHARGE INSTRUCTIONS ENTIRELY AS IT CONTAINS IMPORTANT INFORMATION.  A paper referral to orthopedics was given to you.  Please call [phone] to schedule an appt with [facility] orthopedics and take your paper referral to your appt.  - Rest.    - Drink plenty of fluids.    - Tylenol or Ibuprofen as directed as needed for fever/pain.    - If you were prescribed antibiotics, please take them to completion.  - If you are female and on birth control pills - please use additional methods of contraception to prevent pregnancy while on antibiotics and for one cycle after.   - If you were prescribed a narcotic medication or muscle relaxer, do not drive or operate heavy equipment or machinery while taking these medications, as they can cause drowsiness.   - If you smoke, please stop smoking.  -You must understand that you've received an Urgent Care treatment only and that you may be released  before all your medical problems are known or treated. You, the patient, will    arrange for follow up care as instructed. Please arrange follow up with your primary medical clinic as soon as possible.   - Follow up with your PCP or specialty clinic as directed in the next 1-2 weeks if not improved or as needed.  You can call [phone] to schedule an appointment with the appropriate provider.    - Please return to Urgent Care or to the Emergency Department if your symptoms worsen.    Patient aware and verbalized understanding.    ¿Qué es adrian fractura distal del radio?    Adrian fractura es un hueso roto. Adrian fractura distal del radio es adrian rotura en el extremo inferior del radio. Felisa es el hueso más graciela del antebrazo. Dado que la rotura se produce cerca de la lynn, se suele decir que es adrian fractura de lynn.   El hueso puede agrietarse o romperse en dos partes o más. Los trozos de hueso pueden courtney quedado alineados o salidos de turner lugar. En ocasiones, el hueso puede traspasar la piel. Los nervios, tejidos y articulaciones cercanos también pueden dañarse. Según la gravedad de la fractura, la recuperación puede rebeka varios meses o más.  ¿Cuáles son las causas de adrian fractura distal del radio?  Felisa tipo de fractura suele deberse con más frecuencia a caer sobre la mano extendida. También pueden deberse a un golpe, un accidente o adrian lesión deportiva.  Síntomas de adrian fractura distal del radio  Los síntomas pueden incluir dolor, inflamación y moretones. Si el hueso atraviesa la piel al romperse, también puede courtney sangrado externo. La lynn puede verse torcida, deformada o doblada. Puede que le resulte difícil  o usar el brazo, la lynn y la mano para las tareas y actividades normales.  Tratamiento de adrian fractura distal del radio  El tratamiento depende de la gravedad de la fractura. De ser necesario, se vuelve a colocar el hueso en turner posición. [place] puede hacerse mediante adrian cirugía o no. Si es  necesaria la cirugía, el cirujano puede usar dispositivos tales blanca pernos, placas o tornillos para sostener juntas las partes del hueso roto. Puede que necesite usar adrian férula o un yeso por un mes o más para proteger el hueso y mantenerlo en turner lugar gwyn turner recuperación. También pueden usarse otros tratamientos para ayudar a reducir los síntomas o recuperar el funcionamiento. Incluyen:  · Compresas frías. Colocar adrian compresa de hielo sobre la tobin lesionada puede ayudar a reducir la inflamación y el dolor.  · Elevar el brazo y la lynn. Mantener el brazo y la lynn levantados por encima del nivel del corazón puede ayudar a reducir la inflamación.  · Medicamentos analgésicos (calmantes). Trevor medicamentos analgésicos de venta con o sin receta puede ayudar a reducir el dolor y la inflamación.  · Ejercicios. Hacer ciertos ejercicios en casa o con un fisioterapeuta puede ayudarle a recobrar la fuerza, la flexibilidad y la amplitud de movimiento de turner brazo, turner lynn y turner mano. Por lo general, no comenzará con los ejercicios hasta después de que le hayan quitado la férula o el yeso.  Posibles complicaciones de adrian fractura distal del radio  Pueden incluir lo siguiente:  · Que el hueso no se suelde trini  · Debilidad, rigidez o pérdida de amplitud de movimiento en el brazo, la lynn o la mano  · Osteoartritis en la articulación de la lynn  Cuándo llamar a turner proveedor de atención médica  Llame a turner proveedor de atención médica de inmediato si nota alguno de los siguientes síntomas:  · Fiebre de 100.4º F (38º C) o superior, o según le indiquen  · Los síntomas no mejoran con el tratamiento, o empeoran  · Entumecimiento, frío o inflamación del brazo, la mano o los dedos  · Las uñas de la mano se vuelven de color azulado o grisáceo  · La férula o el yeso se dañan o se sienten demasiado sueltos o demasiado ajustados  · Síntomas nuevos   Date Last Reviewed: 3/10/2016  © 3889-4242 [employer] [address]  [place] [address]. All rights reserved. This information is not intended as a substitute for professional medical care. Always follow your healthcare professional's instructions.

## 2020-01-29 ENCOUNTER — TELEPHONE (OUTPATIENT)
Dept: ORTHOPEDICS | Facility: CLINIC | Age: 55
End: 2020-01-29

## 2020-01-29 NOTE — TELEPHONE ENCOUNTER
----- Message from Christy Bond PA-C sent at 1/29/2020  2:02 PM CST -----  She has appt on Monday at 7am- she needs a .

## 2020-01-30 NOTE — PROGRESS NOTES
[facility]  History & Physical  Orthopedics    SUBJECTIVE:      (Hernández)    Chief Complaint: left wrist fracture    Referring Provider: Self, Aaareferral     She speaks Tristanian. Son is used as .    History of Present Illness:  Patient is a 54 y.o. right hand dominant female who presents today to be released back to work.     Seen by  on 12/18/19 with left distal radius fracture. Was placed in splint and referred to ortho. She followed back up with UC on 1/2/20, XRs at that time showed fracture was healing. She was placed in a thumb spica splint and advised to see ortho. She wants to return back to full duty work- she has been out since initial injury.     The patient is a/an  in hotel.    Onset of symptoms/DOI was 12/18/19.    Symptoms are aggravated by nothing. No current pain.    Symptoms are alleviated by nothing. No current pain..    Symptoms consist of minimal stiffness in left wrist. No numbness or tingling.    The patient rates their pain as a 0/10.    Attempted treatment(s) and/or interventions include rest and splinting/casting.     The patient denies any fevers, chills, N/V, D/C and presents for evaluation.       History reviewed. No pertinent past medical history.  Past Surgical History:   Procedure Laterality Date    APPENDECTOMY       Review of patient's allergies indicates:  No Known Allergies  Social History     Social History Narrative    Not on file     Family History   Problem Relation Age of Onset    No Known Problems Mother     No Known Problems Father          Current Outpatient Medications:     aluminum-magnesium hydroxide-simethicone (MAALOX ADVANCED) 200-200-20 mg/5 mL Susp, Take 10 mLs by mouth 4 (four) times daily before meals and nightly. (Patient not taking: Reported on 2/3/2020), Disp: 120 mL, Rfl: 0    esomeprazole (NEXIUM) 20 MG capsule, Take 1 capsule (20 mg total) by mouth before breakfast. 30 minutes before meal (Patient  "not taking: Reported on 2/3/2020), Disp: 30 capsule, Rfl: 2    ibuprofen (ADVIL,MOTRIN) 600 MG tablet, Take 1 tablet (600 mg total) by mouth every 6 (six) hours as needed. (Patient not taking: Reported on 1/25/2020), Disp: 30 tablet, Rfl: 0    ondansetron (ZOFRAN-ODT) 8 MG TbDL, Take 1 tablet (8 mg total) by mouth 3 (three) times daily as needed. (Patient not taking: Reported on 1/25/2020), Disp: 15 tablet, Rfl: 0    simethicone (MYLICON) 80 MG chewable tablet, Take 1 tablet (80 mg total) by mouth every 6 (six) hours as needed for Flatulence. (Patient not taking: Reported on 1/25/2020), Disp: 30 tablet, Rfl: 0      Review of Systems   Constitutional: Negative for chills, fever, malaise/fatigue and weight loss.   HENT: Negative for hearing loss, nosebleeds and tinnitus.    Eyes: Negative for blurred vision and double vision.   Respiratory: Negative for cough, hemoptysis, shortness of breath and wheezing.    Cardiovascular: Negative for chest pain and palpitations.   Gastrointestinal: Negative for blood in stool, diarrhea, nausea and vomiting.   Genitourinary: Negative for dysuria, frequency, hematuria and urgency.   Skin: Negative for rash.   Neurological: Negative for dizziness, tingling, seizures, weakness and headaches.   Endo/Heme/Allergies: Negative for polydipsia. Does not bruise/bleed easily.   Psychiatric/Behavioral: Negative for depression. The patient is not nervous/anxious.        OBJECTIVE:      Vital Signs (Most Recent):  Vitals:    02/03/20 0735   BP: (!) 140/90   BP Location: Right arm   Patient Position: Sitting   BP Method: X-Large (Manual)   Pulse: 66   Resp: 16   Weight: 99.3 kg (219 lb)   Height: 5' 5" (1.651 m)     Body mass index is 36.44 kg/m².    RIGHT Hand/Wrist Examination:    Observation/Inspection:  Swelling  none    Deformity  none  Discoloration  none     Scars   none    Atrophy  none    HAND/WRIST EXAMINATION:  Finkelstein's Test   Neg  WHAT Test    Neg  Snuff box " tenderness   Neg  Hook of Hamate Tenderness  Neg  CMC grind    Neg    Neurovascular Exam:  Digits WWP, brisk CR < 3s throughout  NVI motor/LTS to M/R/U nerves, radial pulse 2+  Tinel's Test - Carpal Tunnel  Neg  Tinel's Test - Cubital Tunnel  Neg  Phalen's Test    Neg  Median Nerve Compression Test Neg    ROM hand/wrist/elbow full, painless      LEFT Hand/Wrist Examination:    Observation/Inspection:  Swelling  none    Deformity  none  Discoloration  none     Scars   none    Atrophy  None    No tenderness over distal radius/radial styloid.     HAND/WRIST EXAMINATION:  Finkelstein's Test   Neg  WHAT Test    Neg  Snuff box tenderness   Neg  Hook of Hamate Tenderness  Neg  CMC grind    Neg    Neurovascular Exam:  Digits WWP, brisk CR < 3s throughout  NVI motor/LTS to M/R/U nerves, radial pulse 2+  Tinel's Test - Carpal Tunnel  Neg  Tinel's Test - Cubital Tunnel  Neg  Phalen's Test    Neg  Median Nerve Compression Test Neg    ROM hand/elbow full, painless. Reasonable ROM of left wrist with minimal stiffness. No weakness noted.       XRAY INTERPRETATION:   X-rays of left wrist dated 1/25/20 are personally reviewed and show no acute displaced fracture or dislocation of the wrist.  There is some osteopenic change involving the thenar aspect of the distal radius, may reflect that of degenerative change or sequela of prior injury. Appears that fracture line through styloid process has healed.       ASSESSMENT/PLAN:      54 y.o. yo female with healed fracture of left radial styloid per XRs 1/25/20. She has no pain and minimal stiffness. She wants to go back to work. Treatment options reviewed with patient and her son along with above left wrist XRs. Following plan made:     - Okay to return to work as  full duty with no restrictions. Given note.   - Advised to use wrist normally. Continue to work on gentle ROM exercises.   - Consider OT if pain/stiffness persist. I think she will do fine once she gets  back to normal activities.     She will f/u with me prn.

## 2020-02-03 ENCOUNTER — OFFICE VISIT (OUTPATIENT)
Dept: ORTHOPEDICS | Facility: CLINIC | Age: 55
End: 2020-02-03
Payer: MEDICAID

## 2020-02-03 VITALS
RESPIRATION RATE: 16 BRPM | DIASTOLIC BLOOD PRESSURE: 90 MMHG | HEIGHT: 65 IN | HEART RATE: 66 BPM | SYSTOLIC BLOOD PRESSURE: 140 MMHG | BODY MASS INDEX: 36.49 KG/M2 | WEIGHT: 219 LBS

## 2020-02-03 DIAGNOSIS — S52.515D NONDISPLACED FRACTURE OF LEFT RADIAL STYLOID PROCESS, SUBSEQUENT ENCOUNTER FOR CLOSED FRACTURE WITH ROUTINE HEALING: Primary | ICD-10-CM

## 2020-02-03 PROCEDURE — 99999 PR PBB SHADOW E&M-EST. PATIENT-LVL IV: ICD-10-PCS | Mod: PBBFAC,,, | Performed by: PHYSICIAN ASSISTANT

## 2020-02-03 PROCEDURE — 99202 PR OFFICE/OUTPT VISIT, NEW, LEVL II, 15-29 MIN: ICD-10-PCS | Mod: S$PBB,,, | Performed by: PHYSICIAN ASSISTANT

## 2020-02-03 PROCEDURE — 99202 OFFICE O/P NEW SF 15 MIN: CPT | Mod: S$PBB,,, | Performed by: PHYSICIAN ASSISTANT

## 2020-02-03 PROCEDURE — 99999 PR PBB SHADOW E&M-EST. PATIENT-LVL IV: CPT | Mod: PBBFAC,,, | Performed by: PHYSICIAN ASSISTANT

## 2020-02-03 PROCEDURE — 99214 OFFICE O/P EST MOD 30 MIN: CPT | Mod: PBBFAC,PN | Performed by: PHYSICIAN ASSISTANT

## 2020-02-03 NOTE — PATIENT INSTRUCTIONS
It was nice to meet you today! I am glad you are feeling better.     You can go back to work full duty. Work on moving and using the wrist as normal.     Please stay in touch and call me if you need anything.     Christy   [phone]

## 2021-06-16 ENCOUNTER — TELEPHONE (OUTPATIENT)
Dept: GASTROENTEROLOGY | Facility: CLINIC | Age: 56
End: 2021-06-16

## 2021-06-21 ENCOUNTER — TELEPHONE (OUTPATIENT)
Dept: GASTROENTEROLOGY | Facility: CLINIC | Age: 56
End: 2021-06-21

## 2021-06-21 DIAGNOSIS — Z12.11 SCREEN FOR COLON CANCER: Primary | ICD-10-CM

## 2021-06-21 RX ORDER — SODIUM, POTASSIUM,MAG SULFATES 17.5-3.13G
1 SOLUTION, RECONSTITUTED, ORAL ORAL DAILY
Qty: 1 KIT | Refills: 0 | Status: SHIPPED | OUTPATIENT
Start: 2021-06-21 | End: 2021-06-23

## 2021-07-20 PROBLEM — Z12.11 SCREEN FOR COLON CANCER: Status: ACTIVE | Noted: 2021-07-20

## 2021-08-09 ENCOUNTER — TELEPHONE (OUTPATIENT)
Dept: GASTROENTEROLOGY | Facility: CLINIC | Age: 56
End: 2021-08-09

## 2021-08-11 ENCOUNTER — TELEPHONE (OUTPATIENT)
Dept: GASTROENTEROLOGY | Facility: CLINIC | Age: 56
End: 2021-08-11

## 2021-08-13 ENCOUNTER — TELEPHONE (OUTPATIENT)
Dept: GASTROENTEROLOGY | Facility: CLINIC | Age: 56
End: 2021-08-13

## 2021-08-17 ENCOUNTER — TELEPHONE (OUTPATIENT)
Dept: GASTROENTEROLOGY | Facility: CLINIC | Age: 56
End: 2021-08-17

## 2021-08-23 ENCOUNTER — TELEPHONE (OUTPATIENT)
Dept: GASTROENTEROLOGY | Facility: CLINIC | Age: 56
End: 2021-08-23

## 2023-06-16 ENCOUNTER — PATIENT MESSAGE (OUTPATIENT)
Dept: PODIATRY | Facility: CLINIC | Age: 58
End: 2023-06-16
Payer: MEDICAID